# Patient Record
Sex: MALE | Race: OTHER | NOT HISPANIC OR LATINO | URBAN - METROPOLITAN AREA
[De-identification: names, ages, dates, MRNs, and addresses within clinical notes are randomized per-mention and may not be internally consistent; named-entity substitution may affect disease eponyms.]

---

## 2017-03-29 ENCOUNTER — EMERGENCY (EMERGENCY)
Facility: HOSPITAL | Age: 27
LOS: 1 days | Discharge: HOME | End: 2017-03-29
Attending: EMERGENCY MEDICINE | Admitting: EMERGENCY MEDICINE
Payer: COMMERCIAL

## 2017-03-29 VITALS
DIASTOLIC BLOOD PRESSURE: 89 MMHG | WEIGHT: 149.91 LBS | RESPIRATION RATE: 16 BRPM | HEART RATE: 98 BPM | SYSTOLIC BLOOD PRESSURE: 153 MMHG | HEIGHT: 70 IN | OXYGEN SATURATION: 99 % | TEMPERATURE: 97 F

## 2017-03-29 DIAGNOSIS — K06.8 OTHER SPECIFIED DISORDERS OF GINGIVA AND EDENTULOUS ALVEOLAR RIDGE: ICD-10-CM

## 2017-03-29 DIAGNOSIS — K12.1 OTHER FORMS OF STOMATITIS: ICD-10-CM

## 2017-03-29 DIAGNOSIS — J45.909 UNSPECIFIED ASTHMA, UNCOMPLICATED: ICD-10-CM

## 2017-03-29 PROCEDURE — 99283 EMERGENCY DEPT VISIT LOW MDM: CPT

## 2017-03-29 RX ORDER — LIDOCAINE HCL 20 MG/ML
3 VIAL (ML) INJECTION ONCE
Qty: 0 | Refills: 0 | Status: DISCONTINUED | OUTPATIENT
Start: 2017-03-29 | End: 2017-04-02

## 2017-03-29 NOTE — ED PROVIDER NOTE - OBJECTIVE STATEMENT
28 yo male with no significant PMH, p/w left upper gum pain and aphtae for 5 days. Pt took Augmentine, it helped for fever, swelling his cheek, hard to open but still in pain, and urgent care physician on 3 days ago, advised  him to come to ER if there was no improvement. 26 yo male with no significant PMH, p/w left upper gum pain and aphtae for 5 days. Pt took Augmentine, it helped for fever, swelling his cheek, hard to open but still in pain, and urgent care physician on 3 days ago, advised  him to come to ER if there was no improvement.  Attending note. Patient seen and fast track room #6. Agree with the above. This is complaining of pain on the left upper gum.

## 2017-03-29 NOTE — ED PROVIDER NOTE - PHYSICAL EXAMINATION
Attending note. Patient is alert and oriented in no acute distress. Patient has aphthous ulcer lateral to tooth #16 there is no tenderness to percussion of the teeth. There is no TMJ tenderness. There is no adenopathy.

## 2017-03-29 NOTE — ED PROVIDER NOTE - MEDICAL DECISION MAKING DETAILS
26 yo male with 5 days history of oral aphthae with 3 day treatment of Augmentin, swelling, fever and difficulty of opening mouth are improving, but remains pain. pain management, and dental clinic follow up. we gave lidocaine viscous 3cc.

## 2017-03-29 NOTE — ED ADULT NURSE NOTE - OBJECTIVE STATEMENT
Pt presents to Ed with c/o abscess. Pt with dx abscess on lt upper gum for five days treated with  abx presents c/o no relief. Pt with no other complaints denies fever or chills, md evaluated pt.

## 2023-01-10 ENCOUNTER — INPATIENT (INPATIENT)
Facility: HOSPITAL | Age: 33
LOS: 1 days | Discharge: HOME | DRG: 607 | End: 2023-01-12
Attending: INTERNAL MEDICINE | Admitting: INTERNAL MEDICINE
Payer: COMMERCIAL

## 2023-01-10 VITALS
SYSTOLIC BLOOD PRESSURE: 162 MMHG | WEIGHT: 175.05 LBS | RESPIRATION RATE: 17 BRPM | TEMPERATURE: 98 F | HEIGHT: 70 IN | OXYGEN SATURATION: 100 % | DIASTOLIC BLOOD PRESSURE: 99 MMHG | HEART RATE: 68 BPM

## 2023-01-10 DIAGNOSIS — R61 GENERALIZED HYPERHIDROSIS: ICD-10-CM

## 2023-01-10 PROBLEM — J45.909 UNSPECIFIED ASTHMA, UNCOMPLICATED: Chronic | Status: ACTIVE | Noted: 2017-03-29

## 2023-01-10 LAB
ALBUMIN SERPL ELPH-MCNC: 4.6 G/DL — SIGNIFICANT CHANGE UP (ref 3.3–5)
ALP SERPL-CCNC: 66 U/L — SIGNIFICANT CHANGE UP (ref 40–120)
ALT FLD-CCNC: 32 U/L — SIGNIFICANT CHANGE UP (ref 10–45)
ANION GAP SERPL CALC-SCNC: 12 MMOL/L — SIGNIFICANT CHANGE UP (ref 5–17)
APPEARANCE UR: CLEAR — SIGNIFICANT CHANGE UP
AST SERPL-CCNC: 40 U/L — SIGNIFICANT CHANGE UP (ref 10–40)
BACTERIA # UR AUTO: NEGATIVE — SIGNIFICANT CHANGE UP
BASOPHILS # BLD AUTO: 0.03 K/UL — SIGNIFICANT CHANGE UP (ref 0–0.2)
BASOPHILS NFR BLD AUTO: 0.4 % — SIGNIFICANT CHANGE UP (ref 0–2)
BILIRUB SERPL-MCNC: 0.6 MG/DL — SIGNIFICANT CHANGE UP (ref 0.2–1.2)
BILIRUB UR-MCNC: NEGATIVE — SIGNIFICANT CHANGE UP
BUN SERPL-MCNC: 11 MG/DL — SIGNIFICANT CHANGE UP (ref 7–23)
CALCIUM SERPL-MCNC: 9.7 MG/DL — SIGNIFICANT CHANGE UP (ref 8.4–10.5)
CHLORIDE SERPL-SCNC: 103 MMOL/L — SIGNIFICANT CHANGE UP (ref 96–108)
CO2 SERPL-SCNC: 27 MMOL/L — SIGNIFICANT CHANGE UP (ref 22–31)
COLOR SPEC: COLORLESS — SIGNIFICANT CHANGE UP
CREAT SERPL-MCNC: 0.95 MG/DL — SIGNIFICANT CHANGE UP (ref 0.5–1.3)
CRP SERPL-MCNC: 6 MG/L — HIGH (ref 0–4)
CULTURE RESULTS: SIGNIFICANT CHANGE UP
DIFF PNL FLD: NEGATIVE — SIGNIFICANT CHANGE UP
EGFR: 110 ML/MIN/1.73M2 — SIGNIFICANT CHANGE UP
EOSINOPHIL # BLD AUTO: 0.06 K/UL — SIGNIFICANT CHANGE UP (ref 0–0.5)
EOSINOPHIL NFR BLD AUTO: 0.8 % — SIGNIFICANT CHANGE UP (ref 0–6)
EPI CELLS # UR: 0 /HPF — SIGNIFICANT CHANGE UP
GLUCOSE SERPL-MCNC: 83 MG/DL — SIGNIFICANT CHANGE UP (ref 70–99)
GLUCOSE UR QL: NEGATIVE — SIGNIFICANT CHANGE UP
HCT VFR BLD CALC: 43.7 % — SIGNIFICANT CHANGE UP (ref 39–50)
HGB BLD-MCNC: 13.8 G/DL — SIGNIFICANT CHANGE UP (ref 13–17)
HIV 1+2 AB+HIV1 P24 AG SERPL QL IA: SIGNIFICANT CHANGE UP
IMM GRANULOCYTES NFR BLD AUTO: 0.3 % — SIGNIFICANT CHANGE UP (ref 0–0.9)
KETONES UR-MCNC: NEGATIVE — SIGNIFICANT CHANGE UP
LEUKOCYTE ESTERASE UR-ACNC: NEGATIVE — SIGNIFICANT CHANGE UP
LYMPHOCYTES # BLD AUTO: 1.93 K/UL — SIGNIFICANT CHANGE UP (ref 1–3.3)
LYMPHOCYTES # BLD AUTO: 26.3 % — SIGNIFICANT CHANGE UP (ref 13–44)
MAGNESIUM SERPL-MCNC: 2.2 MG/DL — SIGNIFICANT CHANGE UP (ref 1.6–2.6)
MCHC RBC-ENTMCNC: 27.5 PG — SIGNIFICANT CHANGE UP (ref 27–34)
MCHC RBC-ENTMCNC: 31.6 GM/DL — LOW (ref 32–36)
MCV RBC AUTO: 87.1 FL — SIGNIFICANT CHANGE UP (ref 80–100)
MONOCYTES # BLD AUTO: 0.52 K/UL — SIGNIFICANT CHANGE UP (ref 0–0.9)
MONOCYTES NFR BLD AUTO: 7.1 % — SIGNIFICANT CHANGE UP (ref 2–14)
NEUTROPHILS # BLD AUTO: 4.79 K/UL — SIGNIFICANT CHANGE UP (ref 1.8–7.4)
NEUTROPHILS NFR BLD AUTO: 65.1 % — SIGNIFICANT CHANGE UP (ref 43–77)
NITRITE UR-MCNC: NEGATIVE — SIGNIFICANT CHANGE UP
NRBC # BLD: 0 /100 WBCS — SIGNIFICANT CHANGE UP (ref 0–0)
PH UR: 6.5 — SIGNIFICANT CHANGE UP (ref 5–8)
PHOSPHATE SERPL-MCNC: 3.2 MG/DL — SIGNIFICANT CHANGE UP (ref 2.5–4.5)
PLATELET # BLD AUTO: 291 K/UL — SIGNIFICANT CHANGE UP (ref 150–400)
POTASSIUM SERPL-MCNC: 3.9 MMOL/L — SIGNIFICANT CHANGE UP (ref 3.5–5.3)
POTASSIUM SERPL-SCNC: 3.9 MMOL/L — SIGNIFICANT CHANGE UP (ref 3.5–5.3)
PROT SERPL-MCNC: 8.3 G/DL — SIGNIFICANT CHANGE UP (ref 6–8.3)
PROT UR-MCNC: NEGATIVE — SIGNIFICANT CHANGE UP
RAPID RVP RESULT: SIGNIFICANT CHANGE UP
RBC # BLD: 5.02 M/UL — SIGNIFICANT CHANGE UP (ref 4.2–5.8)
RBC # FLD: 13.2 % — SIGNIFICANT CHANGE UP (ref 10.3–14.5)
RBC CASTS # UR COMP ASSIST: 0 /HPF — SIGNIFICANT CHANGE UP (ref 0–4)
SARS-COV-2 RNA SPEC QL NAA+PROBE: SIGNIFICANT CHANGE UP
SODIUM SERPL-SCNC: 142 MMOL/L — SIGNIFICANT CHANGE UP (ref 135–145)
SP GR SPEC: 1 — LOW (ref 1.01–1.02)
SPECIMEN SOURCE: SIGNIFICANT CHANGE UP
T3 SERPL-MCNC: 83 NG/DL — SIGNIFICANT CHANGE UP (ref 80–200)
T4 AB SER-ACNC: 7 UG/DL — SIGNIFICANT CHANGE UP (ref 4.6–12)
TSH SERPL-MCNC: 1.79 UIU/ML — SIGNIFICANT CHANGE UP (ref 0.27–4.2)
UROBILINOGEN FLD QL: NEGATIVE — SIGNIFICANT CHANGE UP
WBC # BLD: 7.35 K/UL — SIGNIFICANT CHANGE UP (ref 3.8–10.5)
WBC # FLD AUTO: 7.35 K/UL — SIGNIFICANT CHANGE UP (ref 3.8–10.5)
WBC UR QL: 0 /HPF — SIGNIFICANT CHANGE UP (ref 0–5)

## 2023-01-10 PROCEDURE — 99223 1ST HOSP IP/OBS HIGH 75: CPT

## 2023-01-10 PROCEDURE — 71046 X-RAY EXAM CHEST 2 VIEWS: CPT | Mod: 26

## 2023-01-10 PROCEDURE — 74177 CT ABD & PELVIS W/CONTRAST: CPT | Mod: 26

## 2023-01-10 PROCEDURE — 71260 CT THORAX DX C+: CPT | Mod: 26

## 2023-01-10 PROCEDURE — 99285 EMERGENCY DEPT VISIT HI MDM: CPT

## 2023-01-10 RX ORDER — IPRATROPIUM/ALBUTEROL SULFATE 18-103MCG
3 AEROSOL WITH ADAPTER (GRAM) INHALATION EVERY 6 HOURS
Refills: 0 | Status: DISCONTINUED | OUTPATIENT
Start: 2023-01-10 | End: 2023-01-12

## 2023-01-10 RX ORDER — FLUTICASONE FUROATE, UMECLIDINIUM BROMIDE AND VILANTEROL TRIFENATATE 200; 62.5; 25 UG/1; UG/1; UG/1
1 POWDER RESPIRATORY (INHALATION)
Qty: 0 | Refills: 0 | DISCHARGE

## 2023-01-10 RX ORDER — MONTELUKAST 4 MG/1
1 TABLET, CHEWABLE ORAL
Qty: 0 | Refills: 0 | DISCHARGE

## 2023-01-10 RX ORDER — ACETAMINOPHEN 500 MG
650 TABLET ORAL EVERY 6 HOURS
Refills: 0 | Status: DISCONTINUED | OUTPATIENT
Start: 2023-01-10 | End: 2023-01-12

## 2023-01-10 RX ORDER — MONTELUKAST 4 MG/1
10 TABLET, CHEWABLE ORAL DAILY
Refills: 0 | Status: DISCONTINUED | OUTPATIENT
Start: 2023-01-10 | End: 2023-01-12

## 2023-01-10 RX ORDER — INFLUENZA VIRUS VACCINE 15; 15; 15; 15 UG/.5ML; UG/.5ML; UG/.5ML; UG/.5ML
0.5 SUSPENSION INTRAMUSCULAR ONCE
Refills: 0 | Status: DISCONTINUED | OUTPATIENT
Start: 2023-01-10 | End: 2023-01-12

## 2023-01-10 RX ADMIN — MONTELUKAST 10 MILLIGRAM(S): 4 TABLET, CHEWABLE ORAL at 21:43

## 2023-01-10 NOTE — CONSULT NOTE ADULT - SUBJECTIVE AND OBJECTIVE BOX
"HPI:  32-year-old male with a past medical history of asthma presenting with fever and night sweats. Patient states that he has had intermittent fevers for 11-1/2 to 2 weeks last fever being 6 days ago. Fevers ranging of 101 to 102 °F. Patient also having night sweats on a nightly basis that it persisted. Patient has been worked up by his primary care doctor, all labs have come back unremarkable. His primary care doctor states that if his symptoms have persisted to come to the emergency department for further work-up. Patient denies sore throat, congestion, chest pain, shortness of breath, nausea, vomiting, abdominal pain, body aches, dysuria, hematuria, Weight loss. Patient states the last time he traveled to [place] in August. (10 Maicol 2023 14:22)"    Above reviewed. 31 yo M with asthma presenting with fever and night sweats  Intermittent fevers over past 2 weeks  No symptoms  No LAD, no sore throat  No cough/sob  No abd pain, no diarrhea, no pyuria  Has night sweats  ID called for further eval    PAST MEDICAL & SURGICAL HISTORY:  Asthma    No significant past surgical history    Allergies    No Known Allergies    Intolerances    ANTIMICROBIALS:      OTHER MEDS:  acetaminophen     Tablet .. 650 milliGRAM(s) Oral every 6 hours PRN  albuterol/ipratropium for Nebulization 3 milliLiter(s) Nebulizer every 6 hours  influenza   Vaccine 0.5 milliLiter(s) IntraMuscular once  montelukast 10 milliGRAM(s) Oral daily    SOCIAL HISTORY: No tobacco, no alcohol, no illicit drugs    FAMILY HISTORY: No family history of autoimmune disease    Drug Dosing Weight  Height (cm): 177.8 (10 Maicol 2023 08:25)  Weight (kg): 79.4 (10 Maicol 2023 08:25)  BMI (kg/m2): 25.1 (10 Maicol 2023 08:25)  BSA (m2): 1.97 (10 Maicol 2023 08:25)    PE:    Vital Signs Last 24 Hrs  T(C): 36.8 (10 Maicol 2023 12:57), Max: 36.9 (10 Maicol 2023 09:02)  T(F): 98.2 (10 Maicol 2023 12:57), Max: 98.5 (10 Maicol 2023 09:02)  HR: 61 (10 Maicol 2023 12:57) (61 - 68)  BP: 114/66 (10 Maicol 2023 12:57) (114/66 - 162/99)  RR: 18 (10 Maicol 2023 12:57) (17 - 18)  SpO2: 100% (10 Maicol 2023 12:57) (100% - 100%)    Gen: AOx3, NAD, non-toxic, pleasant  CV: S1+S2 normal, nontachycardic  Resp: Clear bilat, no resp distress, no crackles/wheezes  Abd: Soft, nontender, +BS  Ext: No LE edema, no wounds  : No Hernandez  IV/Skin: No thrombophlebitis  Msk: No low back pain, no arthralgias, no joint swelling  Neuro: No sensory deficits, no motor deficits    LABS:                        13.8   7.35  )-----------( 291      ( 10 Maicol 2023 09:19 )             43.7     -10    142  |  103  |  11  ----------------------------<  83  3.9   |  27  |  0.95    Ca    9.7      10 Maicol 2023 09:19  Phos  3.2     -10  Mg     2.2     01-10    TPro  8.3  /  Alb  4.6  /  TBili  0.6  /  DBili  x   /  AST  40  /  ALT  32  /  AlkPhos  66  01-10    Urinalysis Basic - ( 10 Maicol 2023 09:27 )    Color: Colorless / Appearance: Clear / S.005 / pH: x  Gluc: x / Ketone: Negative  / Bili: Negative / Urobili: Negative   Blood: x / Protein: Negative / Nitrite: Negative   Leuk Esterase: Negative / RBC: 0 /hpf / WBC 0 /HPF   Sq Epi: x / Non Sq Epi: 0 /hpf / Bacteria: Negative    MICROBIOLOGY:  v.Blood Blood  01-10-23   NEGATIVE for Plasmodium antigens. Microscopy is performed for  confirmation.  This test does not detect the presence of Babesia species.  If Babesiosis is suspected, please order test for Babesia PCR: Babesia  microti PCR Bld  ************************************************************  No Blood Parasites observed by giemsa stain  One negative set of blood smears does not rule out  the possibility of a parasitic infection.  A minimum of 3  specimens should be collected, at least 12-24 hours apart,  over a 36 hour time period.  --  --  Rapid RVP Result: Corteztec (01-10 @ 09:18)    (otherwise reviewed)    RADIOLOGY:    1/10    FINDINGS:    The heart size is normal.  The lungs are clear.  There is no pneumothorax or pleural effusion.    IMPRESSION:  Clear lungs.

## 2023-01-10 NOTE — H&P ADULT - NSHPPHYSICALEXAM_GEN_ALL_CORE
PHYSICAL EXAMINATION:  Vital Signs Last 24 Hrs  T(C): 36.8 (10 Maicol 2023 12:57), Max: 36.9 (10 Maicol 2023 09:02)  T(F): 98.2 (10 Maicol 2023 12:57), Max: 98.5 (10 Maicol 2023 09:02)  HR: 61 (10 Maicol 2023 12:57) (61 - 68)  BP: 114/66 (10 Maicol 2023 12:57) (114/66 - 162/99)  BP(mean): --  RR: 18 (10 Maicol 2023 12:57) (17 - 18)  SpO2: 100% (10 Maicol 2023 12:57) (100% - 100%)    Parameters below as of 10 Maicol 2023 12:57  Patient On (Oxygen Delivery Method): room air      CAPILLARY BLOOD GLUCOSE          GENERAL: NAD  HEAD:  atraumatic, normocephalic  EYES: sclera anicteric  ENMT: mucous membranes moist  NECK: supple, No JVD  CHEST/LUNG: clear to auscultation bilaterally; no rales, rhonchi, or wheezing b/l  HEART: normal S1, S2  ABDOMEN: BS+, soft, ND, NT   EXTREMITIES:  pulses palpable; no clubbing, cyanosis, or edema b/l LEs  NEURO: awake, alert, interactive; moves all extremities  SKIN: no rashes or lesions

## 2023-01-10 NOTE — ED ADULT TRIAGE NOTE - WAS YOUR LAST COVID-19 VACCINE GREATER THAN OR EQUAL TO TWO MONTHS AGO?
Returned call to patient, however, no answer, and mailbox full. Plan to try again later.       Deidra Staley NP    Yes

## 2023-01-10 NOTE — ED PROVIDER NOTE - CLINICAL SUMMARY MEDICAL DECISION MAKING FREE TEXT BOX
32-year-old male with a past medical history of asthma presenting with night sweats and fevers for 1-1/2 to 2 weeks, initially worked up by his primary care doctor everything coming back unremarkable, his primary states that if his symptoms persist he did be worked up in the hospital and to come to the emergency department. Patient currently denying any acute symptoms. Labs, chest x-ray, urine culture, urinalysis, blood cultures, HIV, thyroid studies. 32-year-old male with a past medical history of asthma presenting with night sweats and fevers for 1-1/2 to 2 weeks, initially worked up by his primary care doctor everything coming back unremarkable, his primary states that if his symptoms persist he did be worked up in the hospital and to come to the emergency department. Patient currently denying any acute symptoms. Labs, chest x-ray, urine culture, urinalysis, blood cultures, HIV, thyroid studies.    Jody BORREGO: See my attestation note for history and physical details. Patient had cbc and cmp, CXR done which are non-actionable. Malaria studies pending. TB quantiferon pending. ID consult placed. Patient to be admitted for further work up and ID evaluation for persistent night sweats.

## 2023-01-10 NOTE — H&P ADULT - HISTORY OF PRESENT ILLNESS
32-year-old male with a past medical history of asthma presenting with fever and night sweats. Patient states that he has had intermittent fevers for 11-1/2 to 2 weeks last fever being 6 days ago. Fevers ranging of 101 to 102 °F. Patient also having night sweats on a nightly basis that it persisted. Patient has been worked up by his primary care doctor, all labs have come back unremarkable. His primary care doctor states that if his symptoms have persisted to come to the emergency department for further work-up. Patient denies sore throat, congestion, chest pain, shortness of breath, nausea, vomiting, abdominal pain, body aches, dysuria, hematuria, Weight loss. Patient states the last time he traveled to [place] in August.

## 2023-01-10 NOTE — H&P ADULT - NSHPREVIEWOFSYSTEMS_GEN_ALL_CORE
REVIEW OF SYSTEMS:    CONSTITUTIONAL: + weakness, + fevers no chills + night sweats   EYES/ENT: No visual changes;  No vertigo or throat pain   NECK: No pain or stiffness  RESPIRATORY: No cough, wheezing, hemoptysis; No shortness of breath  CARDIOVASCULAR: No chest pain or palpitations  GASTROINTESTINAL: No abdominal or epigastric pain. No nausea, vomiting, or hematemesis; No diarrhea or constipation. No melena or hematochezia.  GENITOURINARY: No dysuria, frequency or hematuria  NEUROLOGICAL: No numbness or weakness  SKIN: No itching, burning, rashes, or lesions   All other review of systems is negative unless indicated above.

## 2023-01-10 NOTE — H&P ADULT - NSHPSOCIALHISTORY_GEN_ALL_CORE
Social History:    Marital Status:  ( x  )    (   ) Single    (   )    (  )   Occupation:   Lives with: (  ) alone  (  ) children   (x ) spouse   (  ) parents  (  ) other    Substance Use (street drugs): ( x ) never used  (  ) other:  Tobacco Usage:  ( x  ) never smoked   (   ) former smoker   (   ) current smoker  (     ) pack years  (        ) last cigarette date  Alcohol Usage: no    (     ) Advanced Directives: (     ) None    (      ) DNR    (     ) DNI    (     ) Health Care Proxy:

## 2023-01-10 NOTE — ED ADULT NURSE NOTE - OBJECTIVE STATEMENT
33 y/o M with PMHx of asthma presents to the ED with complaints of intermittent fever x 3 weeks. Patient reports fever (Tmax 102.5) intermittently with associated night sweats. Patient was seen by PCP at which time had blood work and advised to present to the ED for persisting symptoms. Patient notes associated fatigue, body aches, constipation, and chills. Reports testing negative for flu, COVID, and RSV. Denies headache, dizziness, cough, recent sick contacts, chest pain, shortness of breath, abdominal pain, nausea, vomiting, diarrhea, urinary changes, weight loss, or leg swelling. AOx4 and speaking coherently with wife at bedside. Breathing is unlabored, spontaneous, and symmetrical. Abdomen and bladder are nondistended. No peripheral edema noted. <2s capillary refill. Ambulatory with full ROM of all extremities.

## 2023-01-10 NOTE — H&P ADULT - NSHPLABSRESULTS_GEN_ALL_CORE
13.8   7.35  )-----------( 291      ( 10 Maicol 2023 09:19 )             43.7       01-10    142  |  103  |  11  ----------------------------<  83  3.9   |  27  |  0.95    Ca    9.7      10 Maicol 2023 09:19  Phos  3.2     01-10  Mg     2.2     01-10    TPro  8.3  /  Alb  4.6  /  TBili  0.6  /  DBili  x   /  AST  40  /  ALT  32  /  AlkPhos  66  0110              Urinalysis Basic - ( 10 Maicol 2023 09:27 )    Color: Colorless / Appearance: Clear / S.005 / pH: x  Gluc: x / Ketone: Negative  / Bili: Negative / Urobili: Negative   Blood: x / Protein: Negative / Nitrite: Negative   Leuk Esterase: Negative / RBC: 0 /hpf / WBC 0 /HPF   Sq Epi: x / Non Sq Epi: 0 /hpf / Bacteria: Negative    < from: Xray Chest 2 Views PA/Lat (01.10.23 @ 09:06) >    IMPRESSION:  Clear lungs.    < end of copied text >    EKG SR

## 2023-01-10 NOTE — H&P ADULT - ASSESSMENT
32 m with    Fever/ Night sweats  - panculture  - crp  - Quantiferon TB  - HIV  - ID evaluation  - CT chest/ abdomen/ pelvis r/o Lymphoma    Asthma  - nebs prn  - stable    DVT prophylaxis  - low risk    Further action as per clinical course     Joe Deal MD phone [phone] 
FAMILY HISTORY:  FH: dementia, mother  FH: prostate cancer, father

## 2023-01-10 NOTE — ED PROVIDER NOTE - PROGRESS NOTE DETAILS
Call Dr. Sanderson's office, left them my callback number, have not heard back from Dr. Sanderson. DO Varun (PGY-2) Jody BORREGO: Discussed case with Dr. Castillo of ID. Based on travel, concern would be for possible TB. Quantiferon pending. Discussed results in detail with patient. Spoke with Dr. Wilfredo MARES, who accepted the patient. Infectious disease has been officially consulted through their service line. DO Varun (PGY-2)

## 2023-01-10 NOTE — ED PROVIDER NOTE - ATTENDING CONTRIBUTION TO CARE
History provided by patient and wife who is with patient at bedside.   Patient is a 32-year-old male with a past medical history that is significant for asthma sent in by his PCP, Reed Sanderson at [phone] for evaluation of fevers and night sweats since December 21st or 22nd.  Patient reports that he has had high fevers up to 101-102 has been feeling fatigue, has had body aches chills and has been sweating at night.  His last fever was last Wednesday around 102.  He has not had any fevers since then.  However he continues to have night sweats.  He states he wakes up with his clothes soaked through.  He denies any chest pain, shortness of breath, abdominal pain, rashes, cough, runny nose, diarrhea, bloody stools, urinary symptoms,.  He denies any headache, vision changes, weak focal weakness, difficulty with speaking, difficulty with speech.  He denies losing any weight.   Wife reports she puts on the AC during the night because she is hot but not sweating. She is not having any symptoms of fever, sweating, aches.     He states that he traveled to [place] in August 2022.  While there he had 2 days of diarrhea that his wife also experienced.  Since August he has had URI illnesses twice.  He reports in early to mid October he had 3 or 4 days of URI symptoms that included fever cough and congestion.  He had similar symptoms of fever cough and congestion around Thanksgiving of 2022.  He has never had a rash.  He denies any of family immediate family history of cancer. He reports his cousins son had leukemia at age 1-1 yo.     Patient saw his pcp on Saturday, 1/8/23 to review blood work that had been done 12/16/22. Patient has a copy of his blood work with him. His cbc showed Neutrophil Abs of 6.57 (lab range 1.40 - 6.51), monocyte Abs of 0.99 (0.22-0.93), low total iron of 32 L otherwise cbc, cmp, cholesterol within normal parameters. Hgb A1C was 6.1, microbiology respiratory panel was negative.     During my interview, patient had no additional complaints. No coughs, rashes, chest pain, shortness of breath, nausea, vomiting, weakness, change in gait, speech, vision changes, sore throat, diarrhea, black or bloody stools, urinary symptoms. He states he had a bit of constipation a few days ago. Patient states he tested negative several times for COVID. He is vaccinated with 2 shots, did not get the booster, had COVID last year in June.     Denies new medications.     VS noted  Gen. no acute distress, Non toxic, well appearing  HEENT: PERRL, EOMI, mmm, pharynx normal, no palpable lymphadenopathy to cervical, submandibular regions, or in neck or clavicle  Lungs: CTAB/L no C/ W /R   CVS: RRR   Abd; Soft non tender, non distended, no CVA tenderness bilaterally  Ext: no edema  Skin: no rash, no palpable lymphadenopathy in bilateral axilla or inguinal areas  Neuro AAOx3 non focal clear speech  a/p: history of fevers but persistent night sweats - per my discussion with patient, he was told to come in by pcp for possible admission. Plan to repeat labs, cultures, do malaria screen, get ekg, CXR, discuss case with ID. Patient and wife are concerned as they plan to travel starting the end of January. They plan to travel to [place] the next 3 months.   - Jody BORREGO History provided by patient and wife who is with patient at bedside.   Patient is a 32-year-old male with a past medical history that is significant for asthma sent in by his PCP, Reed Sanderson at [phone] for evaluation of fevers and night sweats since December 21st or 22nd.  Patient reports that he has had high fevers up to 101-102 has been feeling fatigue, has had body aches chills and has been sweating at night.  His last fever was last Wednesday around 102.  He has not had any fevers since then.  However he continues to have night sweats.  He states he wakes up with his clothes soaked through.  He denies any chest pain, shortness of breath, abdominal pain, rashes, cough, runny nose, diarrhea, bloody stools, urinary symptoms,.  He denies any headache, vision changes, weak focal weakness, difficulty with speaking, difficulty with speech.  He denies losing any weight.   Wife reports she puts on the AC during the night because she is hot but not sweating. She is not having any symptoms of fever, sweating, aches.     He states that he traveled to [place] in August 2022.  While there he had 2 days of diarrhea that his wife also experienced.  Since August he has had URI illnesses twice.  He reports in early to mid October he had 3 or 4 days of URI symptoms that included fever cough and congestion.  He had similar symptoms of fever cough and congestion around Thanksgiving of 2022.  He has never had a rash.  He denies any of family immediate family history of cancer. He reports his cousins son had leukemia at age 1-1 yo.     Patient saw his pcp on Saturday, 1/8/23 to review blood work that had been done 12/16/22. Patient has a copy of his blood work with him. His cbc showed Neutrophil Abs of 6.57 (lab range 1.40 - 6.51), monocyte Abs of 0.99 (0.22-0.93), low total iron of 32 L otherwise cbc, cmp, cholesterol within normal parameters. Hgb A1C was 6.1, microbiology respiratory panel was negative.     During my interview, patient had no additional complaints. No coughs, rashes, chest pain, shortness of breath, nausea, vomiting, weakness, change in gait, speech, vision changes, sore throat, diarrhea, black or bloody stools, urinary symptoms. He states he had a bit of constipation a few days ago. Patient states he tested negative several times for COVID. He is vaccinated with 2 shots, did not get the booster, had COVID last year in June.     Denies new medications. Medications include Trelegy and Montelukast. He works as an  in an office setting.     VS noted  Gen. no acute distress, Non toxic, well appearing  HEENT: PERRL, EOMI, mmm, pharynx normal, no palpable lymphadenopathy to cervical, submandibular regions, or in neck or clavicle  Lungs: CTAB/L no C/ W /R   CVS: RRR   Abd; Soft non tender, non distended, no CVA tenderness bilaterally  Ext: no edema  Skin: no rash, no palpable lymphadenopathy in bilateral axilla or inguinal areas  Neuro AAOx3, face symmetrical, tongue midline, CN 2-12 intact, strength is 5/5 in UE/LE, gait normal, sensation equal bilaterally, clear speech  a/p: history of fevers but persistent night sweats - per my discussion with patient, he was told to come in by pcp for possible admission. Plan to repeat labs, cultures, do malaria screen, get ekg, CXR, discuss case with ID. Patient and wife are concerned as they plan to travel starting the end of January. They plan to travel to [place] the next 3 months.   - Jody BORREGO History provided by patient and wife who is with patient at bedside.   Patient is a 32-year-old male with a past medical history that is significant for asthma sent in by his PCP, Reed Sanderson at [phone] for evaluation of fevers and night sweats since December 21st or 22nd.  Patient reports that he has had high fevers up to 101-102 has been feeling fatigue, has had body aches chills and has been sweating at night.  His last fever was last Wednesday around 102.  He has not had any fevers since then.  However he continues to have night sweats.  He states he wakes up with his clothes soaked through.  He denies any chest pain, shortness of breath, abdominal pain, rashes, cough, runny nose, diarrhea, bloody stools, urinary symptoms,.  He denies any headache, vision changes, weak focal weakness, difficulty with speaking, difficulty with speech.  He denies losing any weight.   Wife reports she puts on the AC during the night because she is hot but not sweating. She is not having any symptoms of fever, sweating, aches.     He states that he traveled to [place] in August 2022.  While there he had 2 days of diarrhea that his wife also experienced.  Since August he has had URI illnesses twice.  He reports in early to mid October he had 3 or 4 days of URI symptoms that included fever cough and congestion.  He had similar symptoms of fever cough and congestion around Thanksgiving of 2022.  He has never had a rash.  He denies any of family immediate family history of cancer. He reports his cousins son had leukemia at age 1-1 yo.     Patient saw his pcp on Saturday, 1/8/23 to review blood work that had been done 01/3/23. Patient has a copy of his blood work with him. His cbc showed Neutrophil Abs of 6.57 (lab range 1.40 - 6.51), monocyte Abs of 0.99 (0.22-0.93), low total iron of 32 L otherwise cbc, cmp, cholesterol within normal parameters. Hgb A1C was 6.1, microbiology respiratory panel was negative.     During my interview, patient had no additional complaints. No coughs, rashes, chest pain, shortness of breath, nausea, vomiting, weakness, change in gait, speech, vision changes, sore throat, diarrhea, black or bloody stools, urinary symptoms. He states he had a bit of constipation a few days ago. Patient states he tested negative several times for COVID. He is vaccinated with 2 shots, did not get the booster, had COVID last year in June.     Denies new medications. Medications include Trelegy and Montelukast. He works as an  in an office setting.     VS noted  Gen. no acute distress, Non toxic, well appearing  HEENT: PERRL, EOMI, mmm, pharynx normal, no palpable lymphadenopathy to cervical, submandibular regions, or in neck or clavicle  Lungs: CTAB/L no C/ W /R   CVS: RRR   Abd; Soft non tender, non distended, no CVA tenderness bilaterally  Ext: no edema  Skin: no rash, no palpable lymphadenopathy in bilateral axilla or inguinal areas  Neuro AAOx3, face symmetrical, tongue midline, CN 2-12 intact, strength is 5/5 in UE/LE, gait normal, sensation equal bilaterally, clear speech  a/p: history of fevers but persistent night sweats - per my discussion with patient, he was told to come in by pcp for possible admission. Plan to repeat labs, cultures, do malaria screen, get ekg, CXR, discuss case with ID. Patient and wife are concerned as they plan to travel starting the end of January. They plan to travel to [place] for the next 3 months.   - Jody BORREGO History provided by patient and wife who is with patient at bedside.   Patient is a 32-year-old male with a past medical history that is significant for asthma sent in by his PCP, Reed Sanderson at [phone] for evaluation of fevers and night sweats since December 21st or 22nd.  Patient reports that he has had high fevers up to 101-102 has been feeling fatigue, has had body aches chills and has been sweating at night.  His last fever was last Wednesday around 102.  He has not had any fevers since then.  However he continues to have night sweats.  He states he wakes up with his clothes soaked through.  He denies any chest pain, shortness of breath, abdominal pain, rashes, cough, runny nose, diarrhea, bloody stools, urinary symptoms,.  He denies any headache, vision changes, weak focal weakness, difficulty with speaking, difficulty with speech.  He denies losing any weight.   Wife reports she puts on the AC during the night because she is hot but not sweating. She is not having any symptoms of fever, sweating, aches.     He states that he traveled to [place] in August 2022.  While there he had 2 days of diarrhea that his wife also experienced.  Since August he has had URI illnesses twice.  He reports in early to mid October he had 3 or 4 days of URI symptoms that included fever cough and congestion.  He had similar symptoms of fever cough and congestion around Thanksgiving of 2022.  He has never had a rash.  He denies any of family immediate family history of cancer. He reports his cousins son had leukemia at age 1-3 yo.     Patient saw his pcp on Saturday, 1/8/23 to review blood work that had been done 01/3/23. Patient has a copy of his blood work with him. His cbc showed Neutrophil Abs of 6.57 (lab range 1.40 - 6.51), monocyte Abs of 0.99 (0.22-0.93), low total iron of 32 L otherwise cbc, cmp, cholesterol within normal parameters. Hgb A1C was 6.1, 12/16/ 22 microbiology respiratory panel was negative.     During my interview, patient had no additional complaints. No coughs, rashes, chest pain, shortness of breath, nausea, vomiting, weakness, change in gait, speech, vision changes, sore throat, diarrhea, black or bloody stools, urinary symptoms. He states he had a bit of constipation a few days ago. Patient states he tested negative several times for COVID. He is vaccinated with 2 shots, did not get the booster, had COVID last year in June.     Denies new medications. Medications include Trelegy and Montelukast. He works as an  in an office setting.     VS noted  Gen. no acute distress, Non toxic, well appearing  HEENT: PERRL, EOMI, mmm, pharynx normal, no palpable lymphadenopathy to cervical, submandibular regions, or in neck or clavicle  Lungs: CTAB/L no C/ W /R   CVS: RRR   Abd; Soft non tender, non distended, no CVA tenderness bilaterally  Ext: no edema  Skin: no rash, no palpable lymphadenopathy in bilateral axilla or inguinal areas  Neuro AAOx3, face symmetrical, tongue midline, CN 2-12 intact, strength is 5/5 in UE/LE, gait normal, sensation equal bilaterally, clear speech  a/p: history of fevers but persistent night sweats - per my discussion with patient, he was told to come in by pcp for possible admission. Differential includes TB, malaria, lymphoma. Plan to repeat labs, cultures, do malaria screen, get ekg, CXR, discuss case with ID. Patient and wife are concerned as they plan to travel starting the end of January. They plan to travel to [place] for the next 3 months.   - Jody BORREGO

## 2023-01-10 NOTE — CONSULT NOTE ADULT - ASSESSMENT
33 yo M with asthma presenting with fever and night sweats  Fever, no leukocytosis  No other symptoms  No recent travel, spent some time in [place] recently  Never in [place], [facility], never lived outside the [place]  No sick contacts  No risk factors for TB  CXR clear  No other symptoms to suggest source  FUO workup  Overall, Fever, night sweats  - Monitor off abx for now  - F/U BCX, Quant gold, HIV  - Check EBV PCR, EBV sero, CMV PCR, syphilis screen, Tick borne panel, babesia  - Would check CT Chest, CT A/P  - Check TTE  - Monitor for focal signs infection  - Noninfectious considerations per team    Evelio Sharma MD  Contact on TEAMS messaging from 9am - 5pm  From 5pm-9am, on weekends, or if no response call [phone]

## 2023-01-10 NOTE — ED PROVIDER NOTE - OBJECTIVE STATEMENT
32-year-old male with a past medical history of asthma presenting with fever and night sweats. Patient states that he has had intermittent fevers for 11-1/2 to 2 weeks last fever being 6 days ago. Fevers ranging of 101 to 102 °F. Patient also having night sweats on a nightly basis that it persisted. Patient has been worked up by his primary care doctor, all labs have come back unremarkable. His primary care doctor states that if his symptoms have persisted to come to the emergency department for further work-up. Patient denies sore throat, congestion, chest pain, shortness of breath, nausea, vomiting, abdominal pain, body aches, dysuria, hematuria, Weight loss. Patient states the last time he traveled to [place] in August.

## 2023-01-11 LAB
BABESIA MICROTI PCR, BLD RESULT: ABNORMAL
CMV DNA CSF QL NAA+PROBE: SIGNIFICANT CHANGE UP
CMV DNA SPEC NAA+PROBE-LOG#: SIGNIFICANT CHANGE UP LOG10IU/ML
CULTURE RESULTS: SIGNIFICANT CHANGE UP
EBV DNA SERPL NAA+PROBE-ACNC: SIGNIFICANT CHANGE UP IU/ML
EBV EA AB SER IA-ACNC: <5 U/ML — SIGNIFICANT CHANGE UP
EBV EA AB TITR SER IF: POSITIVE
EBV EA IGG SER-ACNC: NEGATIVE — SIGNIFICANT CHANGE UP
EBV NA IGG SER IA-ACNC: >600 U/ML — HIGH
EBV PATRN SPEC IB-IMP: SIGNIFICANT CHANGE UP
EBV VCA IGG AVIDITY SER QL IA: POSITIVE
EBV VCA IGM SER IA-ACNC: 29.5 U/ML — HIGH
EBV VCA IGM SER IA-ACNC: <10 U/ML — SIGNIFICANT CHANGE UP
EBV VCA IGM TITR FLD: NEGATIVE — SIGNIFICANT CHANGE UP
EBVPCR LOG: SIGNIFICANT CHANGE UP LOG10IU/ML
SARS-COV-2 RNA SPEC QL NAA+PROBE: SIGNIFICANT CHANGE UP
SPECIMEN SOURCE: SIGNIFICANT CHANGE UP
T PALLIDUM AB TITR SER: NEGATIVE — SIGNIFICANT CHANGE UP

## 2023-01-11 PROCEDURE — 99232 SBSQ HOSP IP/OBS MODERATE 35: CPT

## 2023-01-11 RX ORDER — FLUTICASONE FUROATE, UMECLIDINIUM BROMIDE AND VILANTEROL TRIFENATATE 200; 62.5; 25 UG/1; UG/1; UG/1
1 POWDER RESPIRATORY (INHALATION) DAILY
Refills: 0 | Status: DISCONTINUED | OUTPATIENT
Start: 2023-01-11 | End: 2023-01-12

## 2023-01-11 RX ADMIN — MONTELUKAST 10 MILLIGRAM(S): 4 TABLET, CHEWABLE ORAL at 21:43

## 2023-01-11 RX ADMIN — FLUTICASONE FUROATE, UMECLIDINIUM BROMIDE AND VILANTEROL TRIFENATATE 1 PUFF(S): 200; 62.5; 25 POWDER RESPIRATORY (INHALATION) at 10:50

## 2023-01-12 ENCOUNTER — TRANSCRIPTION ENCOUNTER (OUTPATIENT)
Age: 33
End: 2023-01-12

## 2023-01-12 VITALS
RESPIRATION RATE: 18 BRPM | SYSTOLIC BLOOD PRESSURE: 129 MMHG | DIASTOLIC BLOOD PRESSURE: 79 MMHG | OXYGEN SATURATION: 98 % | TEMPERATURE: 98 F | HEART RATE: 66 BPM

## 2023-01-12 LAB
A PHAGOCYTOPH IGG TITR SER IF: SIGNIFICANT CHANGE UP TITER
B BURGDOR AB SER QL IA: NEGATIVE — SIGNIFICANT CHANGE UP
B MICROTI IGG TITR SER: SIGNIFICANT CHANGE UP TITER
BABESIA MICROTI PCR, BLD RESULT: SIGNIFICANT CHANGE UP
E CHAFFEENSIS IGG TITR SER IF: SIGNIFICANT CHANGE UP TITER

## 2023-01-12 PROCEDURE — 87389 HIV-1 AG W/HIV-1&-2 AB AG IA: CPT

## 2023-01-12 PROCEDURE — 84100 ASSAY OF PHOSPHORUS: CPT

## 2023-01-12 PROCEDURE — 86664 EPSTEIN-BARR NUCLEAR ANTIGEN: CPT

## 2023-01-12 PROCEDURE — 87635 SARS-COV-2 COVID-19 AMP PRB: CPT

## 2023-01-12 PROCEDURE — 71260 CT THORAX DX C+: CPT

## 2023-01-12 PROCEDURE — 81001 URINALYSIS AUTO W/SCOPE: CPT

## 2023-01-12 PROCEDURE — 86753 PROTOZOA ANTIBODY NOS: CPT

## 2023-01-12 PROCEDURE — 87207 SMEAR SPECIAL STAIN: CPT

## 2023-01-12 PROCEDURE — 0225U NFCT DS DNA&RNA 21 SARSCOV2: CPT

## 2023-01-12 PROCEDURE — 80053 COMPREHEN METABOLIC PANEL: CPT

## 2023-01-12 PROCEDURE — 84480 ASSAY TRIIODOTHYRONINE (T3): CPT

## 2023-01-12 PROCEDURE — 84443 ASSAY THYROID STIM HORMONE: CPT

## 2023-01-12 PROCEDURE — 99285 EMERGENCY DEPT VISIT HI MDM: CPT

## 2023-01-12 PROCEDURE — 74177 CT ABD & PELVIS W/CONTRAST: CPT

## 2023-01-12 PROCEDURE — 87799 DETECT AGENT NOS DNA QUANT: CPT

## 2023-01-12 PROCEDURE — 86618 LYME DISEASE ANTIBODY: CPT

## 2023-01-12 PROCEDURE — 71046 X-RAY EXAM CHEST 2 VIEWS: CPT

## 2023-01-12 PROCEDURE — 99232 SBSQ HOSP IP/OBS MODERATE 35: CPT

## 2023-01-12 PROCEDURE — 86480 TB TEST CELL IMMUN MEASURE: CPT

## 2023-01-12 PROCEDURE — 87086 URINE CULTURE/COLONY COUNT: CPT

## 2023-01-12 PROCEDURE — 85025 COMPLETE CBC W/AUTO DIFF WBC: CPT

## 2023-01-12 PROCEDURE — 86780 TREPONEMA PALLIDUM: CPT

## 2023-01-12 PROCEDURE — 86666 EHRLICHIA ANTIBODY: CPT

## 2023-01-12 PROCEDURE — 86665 EPSTEIN-BARR CAPSID VCA: CPT

## 2023-01-12 PROCEDURE — 94640 AIRWAY INHALATION TREATMENT: CPT

## 2023-01-12 PROCEDURE — C8929: CPT

## 2023-01-12 PROCEDURE — 83735 ASSAY OF MAGNESIUM: CPT

## 2023-01-12 PROCEDURE — 87798 DETECT AGENT NOS DNA AMP: CPT

## 2023-01-12 PROCEDURE — 36415 COLL VENOUS BLD VENIPUNCTURE: CPT

## 2023-01-12 PROCEDURE — 84436 ASSAY OF TOTAL THYROXINE: CPT

## 2023-01-12 PROCEDURE — 86663 EPSTEIN-BARR ANTIBODY: CPT

## 2023-01-12 PROCEDURE — 87040 BLOOD CULTURE FOR BACTERIA: CPT

## 2023-01-12 PROCEDURE — 84145 PROCALCITONIN (PCT): CPT

## 2023-01-12 PROCEDURE — 93306 TTE W/DOPPLER COMPLETE: CPT | Mod: 26

## 2023-01-12 PROCEDURE — 86140 C-REACTIVE PROTEIN: CPT

## 2023-01-12 RX ADMIN — FLUTICASONE FUROATE, UMECLIDINIUM BROMIDE AND VILANTEROL TRIFENATATE 1 PUFF(S): 200; 62.5; 25 POWDER RESPIRATORY (INHALATION) at 11:40

## 2023-01-12 NOTE — DISCHARGE NOTE NURSING/CASE MANAGEMENT/SOCIAL WORK - NSDCPEFALRISK_GEN_ALL_CORE
For information on Fall & Injury Prevention, visit: [email or web address] OR  [email or web address] OR  https://www.cdc.gov/steadi/patient.html

## 2023-01-12 NOTE — PROGRESS NOTE ADULT - ASSESSMENT
32 m with    Fever/ Night sweats  - panculture  - crp noted  - Quantiferon TB pending   - HIV negative  - ID follow  - CT chest/ abdomen/ pelvis with no mass or LNE   - Echo    Babesia indeterminate  - repeat Babesia PCR    Tick panel pending     Asthma  - nebs prn  - stable    DVT prophylaxis  - low risk    d/w patient and family at bedside.     Joe Deal MD phone [phone] 
32 m with    Fever/ Night sweats  - BC NTD  - crp noted  - Quantiferon TB pending   - HIV negative  - ID follow  - CT chest/ abdomen/ pelvis with no mass or LNE   - Echo pending     Babesia indeterminate  - repeat Babesia PCR pending     Tick panel pending     Asthma  - nebs prn  - stable    DVT prophylaxis  - low risk    d/w patient and wife over phone.     Joe Deal MD phone [phone] 
33 yo M with asthma presenting with fever and night sweats  Fever, no leukocytosis  No other symptoms  No recent travel, spent some time in [place] recently  Never in [place], [facility], never lived outside the [place]  No sick contacts  No risk factors for TB  CXR clear  No other symptoms to suggest source  FUO workup  Overall, Fever, night sweats  - Monitor off abx for now  - F/U BCX, Quant gold  - F/U Tick borne panel, Babesia  - Check TTE  - Monitor for focal signs infection  - Noninfectious considerations per team    Evelio Sharma MD  Contact on TEAMS messaging from 9am - 5pm  From 5pm-9am, on weekends, or if no response call [phone]
33 yo M with asthma presenting with fever and night sweats  Fever, no leukocytosis  No other symptoms  No recent travel, spent some time in [place] recently  Never in [place], [facility], never lived outside the [place]  No sick contacts  No risk factors for TB  CXR clear  No other symptoms to suggest source  FUO workup  Overall, Fever, night sweats  - Monitor off abx for now  - F/U BCX, Quant gold  - F/U syphilis screen, Tick borne panel  - Repeat Babesia PCR (IND)  - Check TTE  - Monitor for focal signs infection  - Noninfectious considerations per team    Evelio Sharma MD  Contact on TEAMS messaging from 9am - 5pm  From 5pm-9am, on weekends, or if no response call [phone]

## 2023-01-12 NOTE — DISCHARGE NOTE PROVIDER - NSDCCPCAREPLAN_GEN_ALL_CORE_FT
PRINCIPAL DISCHARGE DIAGNOSIS  Diagnosis: Fever  Assessment and Plan of Treatment: Admitted with Fever, night sweats,   CXR clear.  CT Chest/Abd/Pelvis - no lymphadenopathy  Monitored off abx for now  Blood and urine cultures negative   F/u Quant gold, Tick borne panel,   ECHO shows no vegetation - EF 55%, mild diastolic dysfunction  Follow up with ID - Dr. Sharma in 2 weeks and PMD - Dr. Sanderson early next week      SECONDARY DISCHARGE DIAGNOSES  Diagnosis: Exposure to COVID-19 virus  Assessment and Plan of Treatment: You was exposed by COVID room mate in the hospital  Follow up swabs so far negative and pt is asymptomatic  Isolate for 5 days total  Wear a mask when around others.  - Protect those you live with by staying 6 feet away when you are in the same room, wearing a mask.

## 2023-01-12 NOTE — PROGRESS NOTE ADULT - SUBJECTIVE AND OBJECTIVE BOX
CC: F/U for Fever    Saw/spoke to patient. Afeb. No new complaints.    Allergies  No Known Allergies    ANTIMICROBIALS:      PE:    Vital Signs Last 24 Hrs  T(C): 36.8 (2023 15:51), Max: 36.8 (2023 00:23)  T(F): 98.2 (2023 15:51), Max: 98.3 (2023 00:23)  HR: 65 (2023 15:51) (65 - 80)  BP: 154/88 (2023 15:51) (108/81 - 154/88)  RR: 18 (2023 15:51) (18 - 18)  SpO2: 98% (2023 15:51) (97% - 99%)    Gen: AOx3, NAD, non-toxic  CV: Nontachycardic  Resp: Breathing comfortably, RA  Abd: Soft, nontender  IV/Skin: No thrombophlebitis    LABS:                        13.8   7.35  )-----------( 291      ( 10 Maicol 2023 09:19 )             43.7     01-10    142  |  103  |  11  ----------------------------<  83  3.9   |  27  |  0.95    Ca    9.7      10 Maicol 2023 09:19  Phos  3.2     -10  Mg     2.2     10    TPro  8.3  /  Alb  4.6  /  TBili  0.6  /  DBili  x   /  AST  40  /  ALT  32  /  AlkPhos  66  01-10    Urinalysis Basic - ( 10 Maicol 2023 09:27 )    Color: Colorless / Appearance: Clear / S.005 / pH: x  Gluc: x / Ketone: Negative  / Bili: Negative / Urobili: Negative   Blood: x / Protein: Negative / Nitrite: Negative   Leuk Esterase: Negative / RBC: 0 /hpf / WBC 0 /HPF   Sq Epi: x / Non Sq Epi: 0 /hpf / Bacteria: Negative    MICROBIOLOGY:    .Blood Blood  01-10-23   NEGATIVE for Plasmodium antigens. Microscopy is performed for  confirmation.  This test does not detect the presence of Babesia species.  If Babesiosis is suspected, please order test for Babesia PCR: Babesia  microti PCR Bld  ************************************************************  No Blood Parasites observed by giemsa stain  One negative set of blood smears does not rule out  the possibility of a parasitic infection.  A minimum of 3  specimens should be collected, at least 12-24 hours apart,  over a 36 hour time period.  --  --    .Blood Blood-Peripheral  01-10-23   No growth to date.  --  --    .Blood Blood-Peripheral  01-10-23   No growth to date.  --  --    CMVPCR Log: NotDetec Nou02VO/mL (01-10 @ 18:35)  Rapid RVP Result: NotDetec (01-10 @ 09:18)    RADIOLOGY:    1/10 CT:    IMPRESSION:  No thoracic or abdominal lymphadenopathy.    No acute thoracic or abdominal pathology.  
CC: F/U for Fever    Saw/spoke to patient. No fevers, no chills. No new complaints.    Allergies  No Known Allergies    ANTIMICROBIALS:      PE:    Vital Signs Last 24 Hrs  T(C): 36.9 (12 Jan 2023 13:19), Max: 36.9 (12 Jan 2023 13:19)  T(F): 98.4 (12 Jan 2023 13:19), Max: 98.4 (12 Jan 2023 13:19)  HR: 66 (12 Jan 2023 13:19) (58 - 67)  BP: 129/79 (12 Jan 2023 13:19) (119/70 - 129/79)  RR: 18 (12 Jan 2023 13:19) (18 - 18)  SpO2: 98% (12 Jan 2023 13:19) (97% - 99%)    Gen: AOx3, NAD, non-toxic  CV: Nontachycardic  Resp: Breathing comfortably, RA  Abd: Soft, nontender  IV/Skin: No thrombophlebitis    LABS:    No new available    MICROBIOLOGY:    Clean Catch Clean Catch (Midstream)  01-10-23   <10,000 CFU/mL Normal Urogenital Cassie  --  --    .Blood Blood  01-10-23   NEGATIVE for Plasmodium antigens. Microscopy is performed for  confirmation.  This test does not detect the presence of Babesia species.  If Babesiosis is suspected, please order test for Babesia PCR: Babesia  microti PCR Bld  ************************************************************  No Blood Parasites observed by giemsa stain  One negative set of blood smears does not rule out  the possibility of a parasitic infection.  A minimum of 3  specimens should be collected, at least 12-24 hours apart,  over a 36 hour time period.  --  --    .Blood Blood-Peripheral  01-10-23   No growth to date.  --  --    .Blood Blood-Peripheral  01-10-23   No growth to date.  --  --    CMVPCR Log: NotDetec Rqd12KL/mL (01-10 @ 18:35)  Rapid RVP Result: NotDetec (01-10 @ 09:18)    RADIOLOGY:    1/10 CT:  IMPRESSION:  No thoracic or abdominal lymphadenopathy.    No acute thoracic or abdominal pathology.  
Patient is a 32y old  Male who presents with a chief complaint of     SUBJECTIVE / OVERNIGHT EVENTS: Comfortable without new complaints.   Review of Systems  chest pain no  palpitations no  sob no  nausea no  headache no    MEDICATIONS  (STANDING):  albuterol/ipratropium for Nebulization 3 milliLiter(s) Nebulizer every 6 hours  fluticasone furoate/umeclidinium/vilanterol 100-62.5-25 MICROgram(s) Inhaler 1 Puff(s) Inhalation daily  influenza   Vaccine 0.5 milliLiter(s) IntraMuscular once  montelukast 10 milliGRAM(s) Oral daily    MEDICATIONS  (PRN):  acetaminophen     Tablet .. 650 milliGRAM(s) Oral every 6 hours PRN Temp greater or equal to 38.5C (101.3F), Mild Pain (1 - 3)      Vital Signs Last 24 Hrs  T(C): 36.5 (12 Jan 2023 04:37), Max: 36.8 (11 Jan 2023 15:51)  T(F): 97.7 (12 Jan 2023 04:37), Max: 98.2 (11 Jan 2023 15:51)  HR: 58 (12 Jan 2023 04:37) (58 - 71)  BP: 122/79 (12 Jan 2023 04:37) (119/70 - 154/88)  BP(mean): --  RR: 18 (12 Jan 2023 04:37) (18 - 18)  SpO2: 99% (12 Jan 2023 04:37) (97% - 99%)    Parameters below as of 12 Jan 2023 04:37  Patient On (Oxygen Delivery Method): room air        PHYSICAL EXAM:  GENERAL: NAD, well-developed  HEAD:  Atraumatic, Normocephalic  EYES: EOMI, PERRLA, conjunctiva and sclera clear  NECK: Supple, No JVD  CHEST/LUNG: Clear to auscultation bilaterally; No wheeze  HEART: Regular rate and rhythm; No murmurs, rubs, or gallops  ABDOMEN: Soft, Nontender, Nondistended; Bowel sounds present  EXTREMITIES:  2+ Peripheral Pulses, No clubbing, cyanosis, or edema  PSYCH: AAOx3  NEUROLOGY: non-focal  SKIN: No rashes or lesions    LABS:                    Babesia microti PCR, Bld (collected 10 Maicol 2023 18:35)    Culture - Urine (collected 10 Maicol 2023 11:14)  Source: Clean Catch Clean Catch (Midstream)  Final Report (11 Jan 2023 16:41):    <10,000 CFU/mL Normal Urogenital Cassie    Culture - Blood (collected 10 Maicol 2023 08:58)  Source: .Blood Blood-Peripheral  Preliminary Report (11 Jan 2023 15:08):    No growth to date.    Culture - Blood (collected 10 Maicol 2023 08:50)  Source: .Blood Blood-Peripheral  Preliminary Report (11 Jan 2023 15:08):    No growth to date.        RADIOLOGY & ADDITIONAL TESTS:    Imaging Personally Reviewed:    Consultant(s) Notes Reviewed:      Care Discussed with Consultants/Other Providers:  
Patient is a 32y old  Male who presents with a chief complaint of     SUBJECTIVE / OVERNIGHT EVENTS: Comfortable without new complaints. Family at bedside.   Review of Systems  chest pain no  palpitations no  sob no  nausea no  headache no    MEDICATIONS  (STANDING):  albuterol/ipratropium for Nebulization 3 milliLiter(s) Nebulizer every 6 hours  fluticasone furoate/umeclidinium/vilanterol 100-62.5-25 MICROgram(s) Inhaler 1 Puff(s) Inhalation daily  influenza   Vaccine 0.5 milliLiter(s) IntraMuscular once  montelukast 10 milliGRAM(s) Oral daily    MEDICATIONS  (PRN):  acetaminophen     Tablet .. 650 milliGRAM(s) Oral every 6 hours PRN Temp greater or equal to 38.5C (101.3F), Mild Pain (1 - 3)      Vital Signs Last 24 Hrs  T(C): 36.8 (2023 15:51), Max: 36.8 (2023 00:23)  T(F): 98.2 (2023 15:51), Max: 98.3 (2023 00:23)  HR: 65 (2023 15:51) (65 - 71)  BP: 154/88 (2023 15:51) (108/81 - 154/88)  BP(mean): --  RR: 18 (2023 15:51) (18 - 18)  SpO2: 98% (2023 15:51) (97% - 99%)    Parameters below as of 2023 15:51  Patient On (Oxygen Delivery Method): room air        PHYSICAL EXAM:  GENERAL: NAD, well-developed  HEAD:  Atraumatic, Normocephalic  EYES: EOMI, PERRLA, conjunctiva and sclera clear  NECK: Supple, No JVD  CHEST/LUNG: Clear to auscultation bilaterally; No wheeze  HEART: Regular rate and rhythm; No murmurs, rubs, or gallops  ABDOMEN: Soft, Nontender, Nondistended; Bowel sounds present  EXTREMITIES:  2+ Peripheral Pulses, No clubbing, cyanosis, or edema  PSYCH: AAOx3  NEUROLOGY: non-focal  SKIN: No rashes or lesions    LABS:                        13.8   7.35  )-----------( 291      ( 10 Maicol 2023 09:19 )             43.7     01-10    142  |  103  |  11  ----------------------------<  83  3.9   |  27  |  0.95    Ca    9.7      10 Maicol 2023 09:19  Phos  3.2     01-10  Mg     2.2     01-10    TPro  8.3  /  Alb  4.6  /  TBili  0.6  /  DBili  x   /  AST  40  /  ALT  32  /  AlkPhos  66  01-10          Urinalysis Basic - ( 10 Maicol 2023 09:27 )    Color: Colorless / Appearance: Clear / S.005 / pH: x  Gluc: x / Ketone: Negative  / Bili: Negative / Urobili: Negative   Blood: x / Protein: Negative / Nitrite: Negative   Leuk Esterase: Negative / RBC: 0 /hpf / WBC 0 /HPF   Sq Epi: x / Non Sq Epi: 0 /hpf / Bacteria: Negative        Babesia microti PCR, Bld (collected 10 Maicol 2023 18:35)    Culture - Urine (collected 10 Maicol 2023 11:14)  Source: Clean Catch Clean Catch (Midstream)  Final Report (2023 16:41):    <10,000 CFU/mL Normal Urogenital Cassie    Culture - Blood (collected 10 Maicol 2023 08:58)  Source: .Blood Blood-Peripheral  Preliminary Report (2023 15:08):    No growth to date.    Culture - Blood (collected 10 Maicol 2023 08:50)  Source: .Blood Blood-Peripheral  Preliminary Report (2023 15:08):    No growth to date.        RADIOLOGY & ADDITIONAL TESTS:    Imaging Personally Reviewed:    Consultant(s) Notes Reviewed:      Care Discussed with Consultants/Other Providers:

## 2023-01-12 NOTE — DISCHARGE NOTE PROVIDER - NSDCCPTREATMENT_GEN_ALL_CORE_FT
PRINCIPAL PROCEDURE  Procedure: Transthoracic echo  Findings and Treatment: Dimensions:    Normal Values:  LA:     3.0    2.0 - 4.0 cm  Ao:     3.4    2.0 - 3.8 cm  SEPTUM: 0.7    0.6 - 1.2 cm  PWT:    0.7    0.6 - 1.1 cm  LVIDd:  4.6    3.0 - 5.6 cm  LVIDs:  2.9    1.8 - 4.0 cm  Derived variables:  LVMI: 50 g/m2  RWT: 0.30  Fractional short: 37 %  EF (Visual Estimate): 55 %  ------------------------------------------------------------------------  Observations:  Mitral Valve: Normal mitral valve.  Aortic Valve/Aorta: Normal trileaflet aortic valve.  Aortic Root: 3.4 cm.  Left Atrium: Normal left atrium.  LA volume index = 18  cc/m2.  Left Ventricle: Endocardial visualization enhanced with  intravenous injection of Ultrasonic Enhancing Agent  (Definity). Normal left ventricular systolic function.  Normal left ventricular internal dimensions and wall  thicknesses. Mild diastolic dysfunction (Stage I).  Right Heart: Normal right atrium. Normal right ventricular  size and function. Normal tricuspid valve. Pulmonic valve  not well visualized.  Pericardium/Pleura: Normal pericardium with no pericardial  effusion.  Hemodynamic: Estimated right atrial pressure is 8 mm Hg.  ------------------------------------------------------------------------  Conclusions:  1. Endocardial visualization enhanced with intravenous  injection of Ultrasonic Enhancing Agent (Definity). Normal  left ventricular systolic function.  2. Normal right ventricular size and function.

## 2023-01-12 NOTE — DISCHARGE NOTE PROVIDER - NSDCFUADDAPPT_GEN_ALL_CORE_FT
APPTS ARE READY TO BE MADE: [x] YES    Best Family or Patient Contact (if needed):    Additional Information about above appointments (if needed):    1:   2:   3:     Other comments or requests:    APPTS ARE READY TO BE MADE: [x] YES    Best Family or Patient Contact (if needed):    Additional Information about above appointments (if needed):    1:  ID - Dr. Sharma in 2 weeks  2:  PMD - Dr. Sanderson early next week  3:     Other comments or requests:    APPTS ARE READY TO BE MADE: [x] YES    Best Family or Patient Contact (if needed):    Additional Information about above appointments (if needed):    1:  ID - Dr. Sharma in 2 weeks  2:  PMD - Dr. Sanderson early next week  3:     Other comments or requests:     Patient was provided with follow up request details for Dr. Sanderson and was advised to call to schedule follow up within specified time frame.     Patient is scheduled with Dr. Sharma 1/26/23 3pm at [address] [place] [address]

## 2023-01-12 NOTE — DISCHARGE NOTE PROVIDER - CARE PROVIDER_API CALL
Reed Sanderson)  Internal Medicine  [address] [facility], [address]  [place] [address]  Phone: [phone]  Fax: [phone]  Follow Up Time: 1 week  
none

## 2023-01-12 NOTE — DISCHARGE NOTE PROVIDER - HOSPITAL COURSE
31 yo M with asthma presenting with fever and night sweats. Reports intermittent fevers for about  2 weeks last fever being 6 days ago ranging of 101-102 °F. Patient also having night sweats on a nightly basis that it persisted.   Admitted with Fever, no leukocytosis  CXR clear.  CT Chest/Abd/Pelvis - no lymphadenopathy  FUO workup  Monitored off abx for now  Blood and urine cultures negative  F/U Quant gold, Tick borne panel, Repeat Babesia PCR (IND)  - Check TTE       Pt was exposed by COVID room mate in the hospital  Follow up swabs so far negative and pt is asymptomatic 31 yo M with asthma presenting with fever and night sweats. Reports intermittent fevers for about  2 weeks last fever being 6 days ago ranging of 101-102 °F. Patient also having night sweats on a nightly basis that it persisted.   Admitted with Fever, no leukocytosis  CXR clear.  CT Chest/Abd/Pelvis - no lymphadenopathy  FUO workup  Monitored off abx for now  Blood and urine cultures negative an  F/u Quant gold, Tick borne panel,   ECHO shows no vegetation - EF 55%, mild diastolic dysfunction    Pt was exposed by COVID room mate in the hospital  Follow up swabs so far negative and pt is asymptomatic    Medically cleared by Dr. Lucia to discharge pt home with follow up with ID - Dr. Sharma in 2 weeks and PMD - Dr. Sanderson early next week

## 2023-01-12 NOTE — DISCHARGE NOTE NURSING/CASE MANAGEMENT/SOCIAL WORK - NSDCFUADDAPPT_GEN_ALL_CORE_FT
APPTS ARE READY TO BE MADE: [x] YES    Best Family or Patient Contact (if needed):    Additional Information about above appointments (if needed):    1:  ID - Dr. Sharma in 2 weeks  2:  PMD - Dr. Sanderson early next week  3:     Other comments or requests:

## 2023-01-12 NOTE — DISCHARGE NOTE PROVIDER - NSCORESITESY/N_GEN_A_CORE_RD
Does the Patient have a central line?  yes:   Device: Port  Central Line Site: Right  and Chest  Central Line Site Appearance: clear  Is this a port? Yes: Implanted port accessed using a 20 gauge non-coring needle primed with 0.9% sodium chloride. Good blood return obtained.  Blood obtained and sent to lab.  Site Care: Aseptic site care per protocol  Comments:   Central line flushed with: 10 ml 0.9 normal saline  Central line removed: no  Juarez Needle: Juarez needle left in place    Supervising Provider is:  AKIL Luis       No

## 2023-01-12 NOTE — DISCHARGE NOTE NURSING/CASE MANAGEMENT/SOCIAL WORK - PATIENT PORTAL LINK FT
You can access the FollowMyHealth Patient Portal offered by [facility] by registering at the following website: [email or web address]. By joining [facility]’s FollowMyHealth portal, you will also be able to view your health information using other applications (apps) compatible with our system.

## 2023-01-12 NOTE — DISCHARGE NOTE PROVIDER - NSDCMRMEDTOKEN_GEN_ALL_CORE_FT
Singulair 10 mg oral tablet: 1 tab(s) orally once a day  Trelegy Ellipta 100 mcg-62.5 mcg-25 mcg/inh inhalation powder: 1 puff(s) inhaled once a day

## 2023-01-14 LAB
GAMMA INTERFERON BACKGROUND BLD IA-ACNC: 0.02 IU/ML — SIGNIFICANT CHANGE UP
M TB IFN-G BLD-IMP: NEGATIVE — SIGNIFICANT CHANGE UP
M TB IFN-G CD4+ BCKGRND COR BLD-ACNC: 0 IU/ML — SIGNIFICANT CHANGE UP
M TB IFN-G CD4+CD8+ BCKGRND COR BLD-ACNC: 0 IU/ML — SIGNIFICANT CHANGE UP
QUANT TB PLUS MITOGEN MINUS NIL: 8.84 IU/ML — SIGNIFICANT CHANGE UP

## 2023-01-15 LAB
CULTURE RESULTS: SIGNIFICANT CHANGE UP
CULTURE RESULTS: SIGNIFICANT CHANGE UP
SPECIMEN SOURCE: SIGNIFICANT CHANGE UP
SPECIMEN SOURCE: SIGNIFICANT CHANGE UP

## 2023-01-16 ENCOUNTER — TRANSCRIPTION ENCOUNTER (OUTPATIENT)
Age: 33
End: 2023-01-16

## 2023-01-24 ENCOUNTER — NON-APPOINTMENT (OUTPATIENT)
Age: 33
End: 2023-01-24

## 2023-01-26 ENCOUNTER — APPOINTMENT (OUTPATIENT)
Dept: INFECTIOUS DISEASE | Facility: CLINIC | Age: 33
End: 2023-01-26

## 2025-04-04 ENCOUNTER — APPOINTMENT (OUTPATIENT)
Dept: PLASTIC SURGERY | Facility: CLINIC | Age: 35
End: 2025-04-04

## 2025-04-04 DIAGNOSIS — G56.01 CARPAL TUNNEL SYNDROME, RIGHT UPPER LIMB: ICD-10-CM

## 2025-04-04 PROCEDURE — 99204 OFFICE O/P NEW MOD 45 MIN: CPT

## 2025-06-13 ENCOUNTER — APPOINTMENT (OUTPATIENT)
Dept: PHYSICAL MEDICINE AND REHAB | Facility: CLINIC | Age: 35
End: 2025-06-13
Payer: COMMERCIAL

## 2025-06-13 PROCEDURE — 95885 MUSC TST DONE W/NERV TST LIM: CPT

## 2025-06-13 PROCEDURE — 99202 OFFICE O/P NEW SF 15 MIN: CPT | Mod: 25

## 2025-06-13 PROCEDURE — 95909 NRV CNDJ TST 5-6 STUDIES: CPT

## 2025-08-23 ENCOUNTER — APPOINTMENT (OUTPATIENT)
Dept: MRI IMAGING | Facility: HOSPITAL | Age: 35
End: 2025-08-23